# Patient Record
Sex: MALE | Race: AMERICAN INDIAN OR ALASKA NATIVE
[De-identification: names, ages, dates, MRNs, and addresses within clinical notes are randomized per-mention and may not be internally consistent; named-entity substitution may affect disease eponyms.]

---

## 2021-05-17 ENCOUNTER — HOSPITAL ENCOUNTER (EMERGENCY)
Dept: HOSPITAL 5 - ED | Age: 41
Discharge: HOME | End: 2021-05-17
Payer: SELF-PAY

## 2021-05-17 VITALS — SYSTOLIC BLOOD PRESSURE: 136 MMHG | DIASTOLIC BLOOD PRESSURE: 97 MMHG

## 2021-05-17 DIAGNOSIS — R07.89: Primary | ICD-10-CM

## 2021-05-17 LAB
APTT BLD: 32.4 SEC. (ref 24.2–36.6)
BASOPHILS # (AUTO): 0.1 K/MM3 (ref 0–0.1)
BASOPHILS NFR BLD AUTO: 1 % (ref 0–1.8)
BUN SERPL-MCNC: 15 MG/DL (ref 9–20)
BUN/CREAT SERPL: 12 %
CALCIUM SERPL-MCNC: 9 MG/DL (ref 8.4–10.2)
EOSINOPHIL # BLD AUTO: 0.1 K/MM3 (ref 0–0.4)
EOSINOPHIL NFR BLD AUTO: 1.8 % (ref 0–4.3)
HCT VFR BLD CALC: 41.2 % (ref 35.5–45.6)
HEMOLYSIS INDEX: 8
HGB BLD-MCNC: 14.2 GM/DL (ref 11.8–15.2)
INR PPP: 0.98 (ref 0.87–1.13)
LYMPHOCYTES # BLD AUTO: 1.7 K/MM3 (ref 1.2–5.4)
LYMPHOCYTES NFR BLD AUTO: 29.4 % (ref 13.4–35)
MCHC RBC AUTO-ENTMCNC: 35 % (ref 32–34)
MCV RBC AUTO: 81 FL (ref 84–94)
MONOCYTES # (AUTO): 0.8 K/MM3 (ref 0–0.8)
MONOCYTES % (AUTO): 12.8 % (ref 0–7.3)
PLATELET # BLD: 323 K/MM3 (ref 140–440)
RBC # BLD AUTO: 5.1 M/MM3 (ref 3.65–5.03)

## 2021-05-17 PROCEDURE — 71046 X-RAY EXAM CHEST 2 VIEWS: CPT

## 2021-05-17 PROCEDURE — 85610 PROTHROMBIN TIME: CPT

## 2021-05-17 PROCEDURE — 36415 COLL VENOUS BLD VENIPUNCTURE: CPT

## 2021-05-17 PROCEDURE — 80048 BASIC METABOLIC PNL TOTAL CA: CPT

## 2021-05-17 PROCEDURE — 85025 COMPLETE CBC W/AUTO DIFF WBC: CPT

## 2021-05-17 PROCEDURE — 84484 ASSAY OF TROPONIN QUANT: CPT

## 2021-05-17 PROCEDURE — 85730 THROMBOPLASTIN TIME PARTIAL: CPT

## 2021-05-17 PROCEDURE — 93005 ELECTROCARDIOGRAM TRACING: CPT

## 2021-05-17 PROCEDURE — 99284 EMERGENCY DEPT VISIT MOD MDM: CPT

## 2021-05-17 PROCEDURE — 85379 FIBRIN DEGRADATION QUANT: CPT

## 2021-05-17 NOTE — XRAY REPORT
CHEST 2 VIEWS 



INDICATION: 

chest pain. 



COMPARISON: 





FINDINGS:

Support devices: Pacing device is present overlying left hemithorax electrode tip right ventricle Hea
rt: Within normal limits.

Lungs: No acute air space or interstitial disease.

Pleura: No significant pleural effusion. No pneumothorax.



Additional findings: None.



IMPRESSION:

1. No acute findings.



Signer Name: Delta Oneill MD 

Signed: 5/17/2021 5:54 PM

Workstation Name: [id number]

## 2021-05-17 NOTE — EMERGENCY DEPARTMENT REPORT
ED Chest Pain HPI





- General


Chief Complaint: Chest Pain


Stated Complaint: CHEST PAIN


PUI?: No


Time Seen by Provider: 21 17:30


Source: patient


Mode of arrival: Ambulatory


Limitations: No Limitations





- History of Present Illness


Initial Comments: 





Patient is a 40-year-old F American male with a history of Brugada syndrome who 

has a defibrillator for the last 5 years presenting with chest pain.  States 

this morning before going to work he had a episode of sharp chest pain in the 

center chest.  Denies nausea vomiting but did state he was diaphoretic briefly. 

There was some associated shortness of breath.  Patient states the pain lasted 

approximately 5 minutes and then resolved.  He is continued to have some mild 

shortness of breath.  Patient has not seen a cardiologist in the last 2 to 3 

years.  Denies cough cold congestion fevers or chills.


-: Sudden, This morning


Onset: during rest


Pain Radiation: none


Severity scale (0 -10): 5


Quality: sharp


Improves With: nothing


Worsens With: nothing


re: diaphoresis, dyspnea.  denies: nausea, vomting, sense of impending doom


Other Symptoms: denies: cough, fever, syncope, rash, acid taste in mouth, leg 

swelling, palpitations, burping





- Related Data


                                    Allergies











Allergy/AdvReac Type Severity Reaction Status Date / Time


 


No Known Allergies Allergy   Unverified 21 17:06














Heart Score





- HEART Score


History: Slightly suspicious


EKG: Normal


Age: < 45


Risk factors: No known risk factors


Troponin: < normal limit


HEART Score: 0





- EKG Read Time


Time EKG Completed: 17:09


EKG Read Time: 17:15





ED Review of Systems


ROS: 


Stated complaint: CHEST PAIN


Other details as noted in HPI





Comment: All other systems reviewed and negative





ED Past Medical Hx





- Past Medical History


Previous Medical History?: Yes


Additional medical history: defibrillator





- Surgical History


Past Surgical History?: No





- Social History


Smoking Status: Never Smoker


Substance Use Type: None





ED Physical Exam





- General


Limitations: No Limitations


General appearance: alert, in no apparent distress





- Head


Head exam: Present: atraumatic, normocephalic





- Eye


Eye exam: Present: normal appearance, PERRL, EOMI





- ENT


ENT exam: Present: mucous membranes moist





- Neck


Neck exam: Present: normal inspection





- Respiratory


Respiratory exam: Present: normal lung sounds bilaterally.  Absent: respiratory 

distress, wheezes, rales, rhonchi





- Cardiovascular


Cardiovascular Exam: Present: regular rate, normal rhythm, normal heart sounds. 

Absent: systolic murmur, diastolic murmur, rubs, gallop





- GI/Abdominal


GI/Abdominal exam: Present: soft, normal bowel sounds.  Absent: distended, 

tenderness, guarding





- Rectal


Rectal exam: Present: deferred





- Extremities Exam


Extremities exam: Present: normal inspection





- Back Exam


Back exam: Present: normal inspection





- Neurological Exam


Neurological exam: Present: alert, oriented X3





- Psychiatric


Psychiatric exam: Present: normal affect, normal mood





- Skin


Skin exam: Present: warm, dry, intact, normal color.  Absent: rash





ED Course


                                   Vital Signs











  21





  17:08 21:00 21:03


 


Temperature 98.6 F  


 


Pulse Rate 82 71 


 


Respiratory 18 20 20





Rate   


 


Blood Pressure 146/105  


 


Blood Pressure  136/97 





[Left]   


 


O2 Sat by Pulse 98 99 99





Oximetry   














ED Medical Decision Making





- Lab Data


Result diagrams: 


                                 21 17:46





                                 21 17:46








                                   Lab Results











  21 Range/Units





  17:46 17:46 17:46 


 


WBC  5.9    (4.5-11.0)  K/mm3


 


RBC  5.10 H    (3.65-5.03)  M/mm3


 


Hgb  14.2    (11.8-15.2)  gm/dl


 


Hct  41.2    (35.5-45.6)  %


 


MCV  81 L    (84-94)  fl


 


MCH  28    (28-32)  pg


 


MCHC  35 H    (32-34)  %


 


RDW  14.2    (13.2-15.2)  %


 


Plt Count  323    (140-440)  K/mm3


 


Lymph % (Auto)  29.4    (13.4-35.0)  %


 


Mono % (Auto)  12.8 H    (0.0-7.3)  %


 


Eos % (Auto)  1.8    (0.0-4.3)  %


 


Baso % (Auto)  1.0    (0.0-1.8)  %


 


Lymph # (Auto)  1.7    (1.2-5.4)  K/mm3


 


Mono # (Auto)  0.8    (0.0-0.8)  K/mm3


 


Eos # (Auto)  0.1    (0.0-0.4)  K/mm3


 


Baso # (Auto)  0.1    (0.0-0.1)  K/mm3


 


Seg Neutrophils %  55.0    (40.0-70.0)  %


 


Seg Neutrophils #  3.2    (1.8-7.7)  K/mm3


 


PT   12.9   (12.2-14.9)  Sec.


 


INR   0.98   (0.87-1.13)  


 


APTT   32.4   (24.2-36.6)  Sec.


 


D-Dimer   < 135.00   (0-234)  ng/mlDDU


 


Sodium    138  (137-145)  mmol/L


 


Potassium    4.1  (3.6-5.0)  mmol/L


 


Chloride    102.8  ()  mmol/L


 


Carbon Dioxide    25  (22-30)  mmol/L


 


Anion Gap    14  mmol/L


 


BUN    15  (9-20)  mg/dL


 


Creatinine    1.3  (0.8-1.3)  mg/dL


 


Estimated GFR    > 60  ml/min


 


BUN/Creatinine Ratio    12  %


 


Glucose    69 L  ()  mg/dL


 


Calcium    9.0  (8.4-10.2)  mg/dL


 


Troponin T    < 0.010  (0.00-0.029)  ng/mL














  21 Range/Units





  19:19 


 


WBC   (4.5-11.0)  K/mm3


 


RBC   (3.65-5.03)  M/mm3


 


Hgb   (11.8-15.2)  gm/dl


 


Hct   (35.5-45.6)  %


 


MCV   (84-94)  fl


 


MCH   (28-32)  pg


 


MCHC   (32-34)  %


 


RDW   (13.2-15.2)  %


 


Plt Count   (140-440)  K/mm3


 


Lymph % (Auto)   (13.4-35.0)  %


 


Mono % (Auto)   (0.0-7.3)  %


 


Eos % (Auto)   (0.0-4.3)  %


 


Baso % (Auto)   (0.0-1.8)  %


 


Lymph # (Auto)   (1.2-5.4)  K/mm3


 


Mono # (Auto)   (0.0-0.8)  K/mm3


 


Eos # (Auto)   (0.0-0.4)  K/mm3


 


Baso # (Auto)   (0.0-0.1)  K/mm3


 


Seg Neutrophils %   (40.0-70.0)  %


 


Seg Neutrophils #   (1.8-7.7)  K/mm3


 


PT   (12.2-14.9)  Sec.


 


INR   (0.87-1.13)  


 


APTT   (24.2-36.6)  Sec.


 


D-Dimer   (0-234)  ng/mlDDU


 


Sodium   (137-145)  mmol/L


 


Potassium   (3.6-5.0)  mmol/L


 


Chloride   ()  mmol/L


 


Carbon Dioxide   (22-30)  mmol/L


 


Anion Gap   mmol/L


 


BUN   (9-20)  mg/dL


 


Creatinine   (0.8-1.3)  mg/dL


 


Estimated GFR   ml/min


 


BUN/Creatinine Ratio   %


 


Glucose   ()  mg/dL


 


Calcium   (8.4-10.2)  mg/dL


 


Troponin T  < 0.010  (0.00-0.029)  ng/mL














- EKG Data


-: EKG Interpreted by Me





- EKG Data





21 18:12


EKG shows normal sinus rhythm rate of 76.  Axis is normal intervals are normal. 

 Occasional PAC.  No ST segment elevations or depressions.  Time of 

interpretation 1715





- Radiology Data





[facility]  


                                     [address] [place] [address]  


 


                                            XRay Report   


                                               Signed  


 


Patient: EDWINA DANIELS                                                  

              MR#: [id number]  


[id number]          


: 1980                                                                

Acct:[id number]      


 


Age/Sex: 40 / M                                                                

ADM Date: 21     


 


Loc: ED       


Attending Dr:   


 


 


Ordering Physician: HENRIETTA DESAI MD  


Date of Service: 21  


Procedure(s): XR chest routine 2V  


Accession Number(s): [id number]  


 


cc: HENRIETTA DESAI MD   


 


Fluoro Time In Minutes:   


 


CHEST 2 VIEWS   


 


 INDICATION:   


 chest pain.   


 


 COMPARISON:   


 


 


 FINDINGS:  


 Support devices: Pacing device is present overlying left hemithorax electrode 

tip right ventricle 


Heart: Within normal limits.  


 Lungs: No acute air space or interstitial disease.  


 Pleura: No significant pleural effusion. No pneumothorax.  


 


 Additional findings: None.  


 


 IMPRESSION:  


 1. No acute findings.  


 


 Signer Name: Delta Oneill MD   


 Signed: 2021 5:54 PM  


 Workstation Name: [id number]   





- Medical Decision Making





Patient with atypical chest discomfort.  Heart score is 0 has had 2 - troponins 

and no changes in EKG.  D-dimer within normal limits making pulmonary embolism 

less likely.  Patient given outpatient cardiology follow-up will be discharged 

home.


Critical care attestation.: 


If time is entered above; I have spent that time in minutes in the direct care 

of this critically ill patient, excluding procedure time.








ED Disposition


Clinical Impression: 


 Atypical chest pain





Disposition: DC-01 TO HOME OR SELFCARE


Is pt being admited?: No


Does the pt Need Aspirin: No


Condition: Stable


Instructions:  Nonspecific Chest Pain, Adult


Referrals: 


KATARINA JO MD [Staff Physician] - 3-5 Days


Forms:  Work/School Release Form(ED)

## 2021-05-18 NOTE — ELECTROCARDIOGRAPH REPORT
[facility]

                                       

Test Date:    2021               Test Time:    17:09:42

Pat Name:     EDWINA DANIELS           Department:   

Patient ID:   [id number]          Room:          

Gender:       M                        Technician:   NICO

:          1980               Requested By: HENRIETTA DESAI

Order Number: [id number]              Reading MD:   Jordan Vila

                                 Measurements

Intervals                              Axis          

Rate:         76                       P:            78

MI:           134                      QRS:          83

QRSD:         100                      T:            61

QT:           362                                    

QTc:          400                                    

                           Interpretive Statements

Sinus rhythm

Atrial premature complexes

ST elev, probable normal early repol pattern

No previous ECG available for comparison

Electronically Signed On 2021 10:28:41 EDT by Jordan Vila

## 2021-11-11 ENCOUNTER — HOSPITAL ENCOUNTER (EMERGENCY)
Dept: HOSPITAL 5 - ED | Age: 41
Discharge: HOME | End: 2021-11-11
Payer: SELF-PAY

## 2021-11-11 VITALS — DIASTOLIC BLOOD PRESSURE: 100 MMHG | SYSTOLIC BLOOD PRESSURE: 140 MMHG

## 2021-11-11 DIAGNOSIS — R42: Primary | ICD-10-CM

## 2021-11-11 PROCEDURE — 93005 ELECTROCARDIOGRAM TRACING: CPT

## 2021-11-11 PROCEDURE — 99282 EMERGENCY DEPT VISIT SF MDM: CPT

## 2021-11-11 NOTE — EMERGENCY DEPARTMENT REPORT
ED Dizziness HPI





- General


Chief Complaint: Dizziness


Stated Complaint: LIGHTHEADED/DIZZY


Time Seen by Provider: 11/11/21 12:41


Source: patient


Mode of arrival: Ambulatory


Limitations: No Limitations





- History of Present Illness


Initial Comments: 





Patient presents with dizziness.  Last night, while at work, he started to have 

dizziness.  He actually describes a spinning sensation that was abrupt onset.  

It was very intense.  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  Symptoms went away 

and then returned.  He ultimately left work because of this.  He is still having

these episodes today.  Again, it is episodic dizziness.  He does notice this 

when he turns his head.  He has nausea with it but has had no vomiting.  He has 

never had symptoms like this before.  He has no fevers or chills per there is no

cough congestion per there is no runny nose.  He has no head trauma.  He did not

have any prodromal symptoms.  He denies numbness or tingling in the arms or 

legs.





- Related Data


                                  Previous Rx's











 Medication  Instructions  Recorded  Last Taken  Type


 


Meclizine [Antivert] 25 mg PO TID PRN #30 tablet 11/11/21 Unknown Rx











                                    Allergies











Allergy/AdvReac Type Severity Reaction Status Date / Time


 


No Known Allergies Allergy   Verified 11/11/21 12:24














ED Review of Systems


ROS: 


Stated complaint: LIGHTHEADED/DIZZY


Other details as noted in HPI





Comment: All other systems reviewed and negative


Constitutional: denies: fever


Eyes: denies: vision change


ENT: denies: throat pain


Respiratory: denies: cough


Cardiovascular: denies: chest pain


Endocrine: denies: unexplained weight loss


Gastrointestinal: denies: abdominal pain


Genitourinary: denies: dysuria


Musculoskeletal: denies: back pain


Skin: denies: rash


Neurological: denies: headache


Hematological/Lymphatic: denies: easy bruising





ED Past Medical Hx





- Past Medical History


Additional medical history: defibrillator secondary to Brugada syndrome





- Surgical History


Additional Surgical History: defibrillator





- Social History


Smoking Status: Never Smoker


Substance Use Type: None





- Medications


Home Medications: 


                                Home Medications











 Medication  Instructions  Recorded  Confirmed  Last Taken  Type


 


Meclizine [Antivert] 25 mg PO TID PRN #30 tablet 11/11/21  Unknown Rx














ED Physical Exam





- General


Limitations: No Limitations, Other ( pulse ox noted and normal)


General appearance: alert, appears intoxicated





- Head


Head exam: Present: atraumatic, normocephalic, normal inspection





- Eye


Eye exam: Present: normal appearance, PERRL, EOMI, nystagmus ( left lateral 

gaze).  Absent: scleral icterus, conjunctival injection





- ENT


ENT exam: Present: normal exam, normal external ear exam





- Neck


Neck exam: Present: normal inspection.  Absent: meningismus





- Respiratory


Respiratory exam: Present: normal lung sounds bilaterally.  Absent: respiratory 

distress





- Cardiovascular


Cardiovascular Exam: Present: regular rate, normal rhythm





- GI/Abdominal


GI/Abdominal exam: Present: soft.  Absent: distended





- Extremities Exam


Extremities exam: Present: normal capillary refill.  Absent: calf tenderness





- Back Exam


Back exam: Absent: CVA tenderness (R), CVA tenderness (L)





- Neurological Exam


Neurological exam: Present: alert, oriented X3, CN II-XII intact, normal gait.  

Absent: motor sensory deficit





- Psychiatric


Psychiatric exam: Present: normal affect, normal mood





- Skin


Skin exam: Present: warm, dry





ED Course


                                   Vital Signs











  11/11/21





  12:26


 


Temperature 98.7 F


 


Pulse Rate 62


 


Respiratory 18





Rate 


 


Blood Pressure 140/100


 


O2 Sat by Pulse 97





Oximetry 














- Reevaluation(s)


Reevaluation #1: 





11/11/21 13:01


 EKG was noted and the patient was discharged





ED Medical Decision Making





- EKG Data


-: EKG Interpreted by Me





- EKG Data





11/11/21 13:01


 EKG shows a normal sinus rhythm without ectopy.   intervals are normal incl

uding a normal QRS and normal QT corrected.  Patient has no evidence of axis 

deviation.  There is no ST elevation to suggest any.  There is no ST depression 

suggestive of ischemia.





- Medical Decision Making





  Patient presented with onset of a peripheral vertigo.  This is going to 

central for a benign positional vertigo.  Patient does not have any symptoms 

suggestive of a central origin.  There is no neurologic deficit suggestive of 

stroke.  He has no neurologic deficit suggestive of space-occupying lesion like 

tumor.  There is no head trauma that would have suggested subdural or epidural 

hematomas or subarachnoid hemorrhage.  He did not have prodromal symptoms 

suggestive of any other respiratory complaint.


Critical care attestation.: 


If time is entered above; I have spent that time in minutes in the direct care 

of this critically ill patient, excluding procedure time.








ED Disposition


Clinical Impression: 


 Vertigo





Disposition: 01 HOME / SELF CARE / HOMELESS


Is pt being admited?: No


Condition: Stable


Instructions:  How to Perform the Epley Maneuver, Dizziness, Easy-to-Read


Additional Instructions: 


DRINK WATER.  RETURN FOR PROBLEMS.


Prescriptions: 


Meclizine [Antivert] 25 mg PO TID PRN #30 tablet


 PRN Reason: Vertigo


Referrals: 


PRIMARY CARE,MD [Referring] - 3-5 Days


NAJMA LYN MD [Staff Physician] - 3-5 Days


Forms:  Work/School Release Form(ED)

## 2021-11-12 NOTE — ELECTROCARDIOGRAPH REPORT
[facility]

                                       

Test Date:    2021               Test Time:    12:32:55

Pat Name:     EDWINA DANIELS           Department:   

Patient ID:   [id number]          Room:          

Gender:       M                        Technician:   NICO

:          1980               Requested By: ANGELICA DA SILVA

Order Number: [id number]              Reading MD:   Flora Zamora

                                 Measurements

Intervals                              Axis          

Rate:         58                       P:            83

CO:           136                      QRS:          79

QRSD:         97                       T:            61

QT:           401                                    

QTc:          393                                    

                           Interpretive Statements

Sinus rhythm

Probable left atrial enlargement

Otherwise, normal ECG

Compared to ECG 2021 17:09:42

Atrial premature complex(es) no longer present

Electronically Signed On 2021 13:00:58 EST by Flora Zamora

## 2022-01-03 ENCOUNTER — HOSPITAL ENCOUNTER (EMERGENCY)
Dept: HOSPITAL 5 - ED | Age: 42
Discharge: HOME | End: 2022-01-03
Payer: SELF-PAY

## 2022-01-03 VITALS — DIASTOLIC BLOOD PRESSURE: 107 MMHG | SYSTOLIC BLOOD PRESSURE: 147 MMHG

## 2022-01-03 DIAGNOSIS — I10: ICD-10-CM

## 2022-01-03 DIAGNOSIS — Z20.822: Primary | ICD-10-CM

## 2022-01-03 DIAGNOSIS — Z98.890: ICD-10-CM

## 2022-01-03 PROCEDURE — 99282 EMERGENCY DEPT VISIT SF MDM: CPT

## 2022-01-03 NOTE — EMERGENCY DEPARTMENT REPORT
Minor Respiratory





- HPI


Chief Complaint: Weakness


Stated Complaint: COVID TEST


Duration: 5 Days


Minor Respiratory: Yes Able to Tolerate Fluids, No Rhinorrhea, No Sore Throat, 

No Ear Pain, No Cough, No Sick Contacts, No Hemoptysis, No Chest Pain, No 

Shortness of Breath


Other History: 41-year-old -American male presents to the emergency room 

reporting that he had a headache fever nausea and vomiting started on 12 /30/2021.  Patient is unvaccinated.  States he is coming in because he needs a 

work excuse.  Patient has not tested for Covid.  Reports he has a history of 

hypertension but is not treated and does not have a primary care provider.  

Patient vital signs are stable at 147/107, 98.2 temp 75 heart rate.





ED Review of Systems


ROS: 


Stated complaint: COVID TEST


Other details as noted in HPI





Comment: All other systems reviewed and negative





ED Past Medical Hx





- Past Medical History


Additional medical history: defibrillator secondary to Brugada syndrome





- Surgical History


Additional Surgical History: defibrillator





- Social History


Smoking Status: Never Smoker


Substance Use Type: None





- Medications


Home Medications: 


                                Home Medications











 Medication  Instructions  Recorded  Confirmed  Last Taken  Type


 


Meclizine [Antivert] 25 mg PO TID PRN #30 tablet 11/11/21  Unknown Rx














Minor Respiratory Exam





- Exam


General: 


Vital signs noted. No distress. Alert and acting appropriately.





HEENT: Yes Moist Mucous Membranes, No Pharyngeal Erythema, No Pharyngeal 

Exudates, No Rhinorrhea, No Conjuctival Injection, No Frontal Tenderness, No 

Maxillary Tenderness


Ear: Neither TM Bulge, Neither TM Erythema, Neither EAC Pain, Neither EAC 

Discharge


Neck: Yes Supple, No Adenopathy


Lungs: Yes Good Air Exchange, No Wheezes, No Ronchi, No Stridor, No Cough, No 

Labored Respirations, No Retractions, No Use of Accessory Muscles, No Other 

Abnormal Lung Sounds


Heart: Yes Regular, No Murmur


Abdomen: Yes Normal Bowel Sounds, No Tenderness, No Peritoneal Signs


Skin: No Rash, No Edema


Neurologic: 


Alert and oriented, no deficits.








Musculoskeletal: 


Unremarkable.











ED Course


                                   Vital Signs











  01/03/22





  13:28


 


Temperature 98.2 F


 


Pulse Rate 75


 


Respiratory 18





Rate 


 


Blood Pressure 147/107


 


O2 Sat by Pulse 99





Oximetry 














ED Medical Decision Making





- Medical Decision Making





41-year-old -American male presents to the emergency room reporting that 

he had a headache fever nausea and vomiting started on 12/30/2021.  Patient is 

unvaccinated.  States he is coming in because he needs a work excuse.  Patient 

has not tested for Covid.  Reports he has a history of hypertension but is not 

treated and does not have a primary care provider.  Patient vital signs are 

stable at 147/107, 98.2 temp 75 heart rate.


Critical care attestation.: 


If time is entered above; I have spent that time in minutes in the direct care 

of this critically ill patient, excluding procedure time.








ED Disposition


Clinical Impression: 


 Suspected COVID-19 virus infection





Disposition: 01 HOME / SELF CARE / HOMELESS


Is pt being admited?: No


Does the pt Need Aspirin: No


Condition: Stable


Instructions:  COVID-19 Frequently Asked Questions, COVID-19: How to Protect 

Yourself and Others - CDC, Prevent the Spread of COVID-19 if You Are Sick - [facility]


Additional Instructions: 


Your symptoms appear most consistent with a nonspecific viral syndrome.  

However, given this current pandemic, COVID-19 is in the differential of 

possibilities.  Despite your previous negative COVID-19 test, I do recommend 

repeat outpatient Covid 19 testing.  In the meantime, isolate/quarantine 

yourself and stay away from anyone who is elderly, immunocompromised or 

chronically ill.  You can use ibuprofen every 6-8 hours and Tylenol every 4-8 

hours, using the dosing on the back of the bottle, as needed for any fever or 

body aches.  Return to the emergency department with any worsening of your 

symptoms, development of chest pain or shortness of breath, or with any acute 

distress.


Referrals: 


PRIMARY CARE,MD [Primary Care Provider] - 3-5 Days


[facility] [Provider Group] - 3-5 Days


Forms:  Work/School Release Form(ED)